# Patient Record
Sex: MALE
[De-identification: names, ages, dates, MRNs, and addresses within clinical notes are randomized per-mention and may not be internally consistent; named-entity substitution may affect disease eponyms.]

---

## 2020-08-13 PROBLEM — Z00.00 ENCOUNTER FOR PREVENTIVE HEALTH EXAMINATION: Status: ACTIVE | Noted: 2020-08-13

## 2020-08-14 ENCOUNTER — NON-APPOINTMENT (OUTPATIENT)
Age: 35
End: 2020-08-14

## 2020-08-14 ENCOUNTER — APPOINTMENT (OUTPATIENT)
Dept: OPHTHALMOLOGY | Facility: CLINIC | Age: 35
End: 2020-08-14
Payer: MEDICAID

## 2020-08-14 PROCEDURE — 92004 COMPRE OPH EXAM NEW PT 1/>: CPT

## 2020-09-03 ENCOUNTER — APPOINTMENT (OUTPATIENT)
Dept: OPHTHALMOLOGY | Facility: CLINIC | Age: 35
End: 2020-09-03

## 2021-09-24 ENCOUNTER — APPOINTMENT (OUTPATIENT)
Dept: OTOLARYNGOLOGY | Facility: CLINIC | Age: 36
End: 2021-09-24
Payer: MEDICAID

## 2021-09-24 VITALS
SYSTOLIC BLOOD PRESSURE: 127 MMHG | DIASTOLIC BLOOD PRESSURE: 86 MMHG | BODY MASS INDEX: 28.77 KG/M2 | HEIGHT: 66 IN | HEART RATE: 83 BPM | WEIGHT: 179 LBS

## 2021-09-24 DIAGNOSIS — H93.292 OTHER ABNORMAL AUDITORY PERCEPTIONS, LEFT EAR: ICD-10-CM

## 2021-09-24 DIAGNOSIS — H61.22 IMPACTED CERUMEN, LEFT EAR: ICD-10-CM

## 2021-09-24 DIAGNOSIS — H93.8X2 OTHER SPECIFIED DISORDERS OF LEFT EAR: ICD-10-CM

## 2021-09-24 PROCEDURE — 99203 OFFICE O/P NEW LOW 30 MIN: CPT | Mod: 25

## 2021-09-24 PROCEDURE — G0268 REMOVAL OF IMPACTED WAX MD: CPT

## 2021-09-24 PROCEDURE — 92567 TYMPANOMETRY: CPT

## 2021-09-24 PROCEDURE — 92557 COMPREHENSIVE HEARING TEST: CPT

## 2021-09-24 RX ORDER — TRAZODONE HYDROCHLORIDE 50 MG/1
50 TABLET ORAL
Qty: 30 | Refills: 0 | Status: COMPLETED | COMMUNITY
Start: 2021-08-27

## 2021-09-24 NOTE — PROCEDURE
[FreeTextEntry3] : Procedure note:  Left cerumenectomy\par \par Sep 24, 2021 \par \par Description of Procedure:  Cerumen impaction was noted requiring debridement under the operating microscope using otologic instrumentation.  The patient tolerated the procedure without complications.\par \par  yes yes

## 2021-09-24 NOTE — CONSULT LETTER
[Dear  ___] : Dear  [unfilled], [Consult Letter:] : I had the pleasure of evaluating your patient, [unfilled]. [Please see my note below.] : Please see my note below. [Consult Closing:] : Thank you very much for allowing me to participate in the care of this patient.  If you have any questions, please do not hesitate to contact me. [Sincerely,] : Sincerely, [FreeTextEntry2] : Dr Rodríguez Loomis,  [FreeTextEntry3] : Dr. Antwon Pascal\par Otology/Neurotology\par [facility] \par [facility]

## 2021-09-24 NOTE — HISTORY OF PRESENT ILLNESS
[de-identified] : : [id number]\par \par 35 year old male referred by Dr Rodríguez Loomis, PCP for bilateral clogged ears, left more than the right with associated ear itching. Reports left ear hearing is muffled. Denies otalgia, otorrhea, ear infections, dizziness, tinnitus.

## 2021-09-24 NOTE — REASON FOR VISIT
[Initial Consultation] : an initial consultation for [FreeTextEntry2] : referred by Dr Rodríguez Loomis, PCP for bilateral clogged ears